# Patient Record
Sex: FEMALE | Race: WHITE | Employment: FULL TIME | URBAN - METROPOLITAN AREA
[De-identification: names, ages, dates, MRNs, and addresses within clinical notes are randomized per-mention and may not be internally consistent; named-entity substitution may affect disease eponyms.]

---

## 2017-04-10 ENCOUNTER — OFFICE VISIT (OUTPATIENT)
Dept: FAMILY MEDICINE CLINIC | Facility: CLINIC | Age: 42
End: 2017-04-10

## 2017-04-10 VITALS
HEART RATE: 82 BPM | SYSTOLIC BLOOD PRESSURE: 118 MMHG | WEIGHT: 250.63 LBS | DIASTOLIC BLOOD PRESSURE: 70 MMHG | TEMPERATURE: 98 F

## 2017-04-10 DIAGNOSIS — J02.0 STREP PHARYNGITIS: Primary | ICD-10-CM

## 2017-04-10 PROCEDURE — 87880 STREP A ASSAY W/OPTIC: CPT | Performed by: NURSE PRACTITIONER

## 2017-04-10 PROCEDURE — 99214 OFFICE O/P EST MOD 30 MIN: CPT | Performed by: NURSE PRACTITIONER

## 2017-04-10 RX ORDER — CEPHALEXIN 500 MG/1
500 CAPSULE ORAL 3 TIMES DAILY
Qty: 30 CAPSULE | Refills: 0 | Status: SHIPPED | OUTPATIENT
Start: 2017-04-10 | End: 2017-04-20

## 2017-04-10 NOTE — PROGRESS NOTES
CHIEF COMPLAINT:   Patient presents with:  Sore Throat      HPI:   Gaviota Garland is a 37year old female who presents to clinic today with complaints of sore throat- daughter ill with strep  Symptoms started this am- no fever.  No headache , no nausea, v toothbrush, tylenol/ibuprofen for pain. Strep is transmitted via saliva, therefore no sharing drinking glasses or silverware, no kissing on the mouth, follow up if symptoms persist or increase.           Patient voiced understanding and is in agreement wit

## 2017-04-10 NOTE — PATIENT INSTRUCTIONS
Rest, salt water gargles, new toothbrush, tylenol/ibuprofen for pain. Strep is transmitted via saliva, therefore no sharing drinking glasses or silverware, no kissing on the mouth, follow up if symptoms persist or increase.

## 2017-07-14 ENCOUNTER — HOSPITAL (OUTPATIENT)
Dept: OTHER | Age: 42
End: 2017-07-14

## (undated) NOTE — MR AVS SNAPSHOT
[address]  [place] [id number]  [phone]               Thank you for choosing us for your health care visit with ANTONIO Ramirez.   We are glad to serve you and happy to provide you with this summary o information, go to [email or web address] and click on the Sign Up Now link in the [employer] box. Enter your [employer] Activation Code exactly as it appears below along with your Zip Code and Date of Birth to complete the sign-up process.  If you do You don’t need to join a gym. Home exercises work great.  Put more priority on exercise in your life                    Visit [facility] online at  [email or web address]